# Patient Record
(demographics unavailable — no encounter records)

---

## 2024-10-28 NOTE — DEVELOPMENTAL MILESTONES
[Normal Development] : Normal Development [None] : none [Imitates scribbling] : imitates scribbling [Drinks from cup with little] : drinks from cup with little spilling [Points to ask for something] : points to ask for something or to get help [Uses 3 words other than names] : uses 3 words other than names [Speaks in sounds that seem like] : speaks in sounds that seem like an unknown language [Follows directions that do not] : follows direction that do not include a gesture [Looks when parent says,] : looks when parent says, "Where is...?" [Squats to  objects] : squats to  objects [Crawls up a few steps] : crawls up a few steps [Begins to run] : begins to run [Makes eliz with crayon] : makes eliz with tereyon [Drops object into and takes object] : drops object into and takes object out of container

## 2024-10-28 NOTE — PHYSICAL EXAM
[Alert] : alert [No Acute Distress] : no acute distress [Normocephalic] : normocephalic [Anterior [place] Closed] : anterior fontanelle closed [Red Reflex Bilateral] : red reflex bilateral [PERRL] : PERRL [Normally Placed Ears] : normally placed ears [Auricles Well Formed] : auricles well formed [Clear Tympanic membranes with present light reflex and bony landmarks] : clear tympanic membranes with present light reflex and bony landmarks [No Discharge] : no discharge [Nares Patent] : nares patent [Palate Intact] : palate intact [Uvula Midline] : uvula midline [Tooth Eruption] : tooth eruption  [Supple, full passive range of motion] : supple, full passive range of motion [No Palpable Masses] : no palpable masses [Symmetric Chest Rise] : symmetric chest rise [Clear to Auscultation Bilaterally] : clear to auscultation bilaterally [Regular Rate and Rhythm] : regular rate and rhythm [S1, S2 present] : S1, S2 present [No Murmurs] : no murmurs [+2 Femoral Pulses] : +2 femoral pulses [Soft] : soft [NonTender] : non tender [Non Distended] : non distended [Normoactive Bowel Sounds] : normoactive bowel sounds [No Hepatomegaly] : no hepatomegaly [No Splenomegaly] : no splenomegaly [Central Urethral Opening] : central urethral opening [Testicles Descended Bilaterally] : testicles descended bilaterally [Patent] : patent [Normally Placed] : normally placed [No Abnormal Lymph Nodes Palpated] : no abnormal lymph nodes palpated [No Clavicular Crepitus] : no clavicular crepitus [Negative Schaeffer-Ortalani] : negative Schaeffer-Ortalani [Symmetric Buttocks Creases] : symmetric buttocks creases [No Spinal Dimple] : no spinal dimple [NoTuft of Hair] : no tuft of hair [Cranial Nerves Grossly Intact] : cranial nerves grossly intact [No Rash or Lesions] : no rash or lesions

## 2024-10-28 NOTE — HISTORY OF PRESENT ILLNESS
[Mother] : mother [Cow's milk (Ounces per day ___)] : consumes [unfilled] oz of cow's milk per day [Normal] : Normal [Brushing teeth] : Brushing teeth [Vitamin] : Primary Fluoride Source: Vitamin [No] : Not at  exposure [Car seat in back seat] : Car seat in back seat [FreeTextEntry7] : pt has been well

## 2024-11-03 NOTE — PHYSICAL EXAM
[NL] : regular rate and rhythm, normal S1, S2 audible, no murmurs [FreeTextEntry7] : + r/r/w R>L. No distress

## 2024-11-03 NOTE — PLAN
[TextEntry] : Disc sympt tx. Disc s/s of resp distress. Rec re-eval if sx's worsen. Disc mycoplasma but PE more c/w viral bronchiolitis

## 2024-11-03 NOTE — HISTORY OF PRESENT ILLNESS
[de-identified] : cough [FreeTextEntry6] :  Pt with few d h/o c/c. Since yesterday feels warm and has ? wheeze  No fever med given today

## 2024-11-03 NOTE — HISTORY OF PRESENT ILLNESS
[de-identified] : cough [FreeTextEntry6] :  Pt with few d h/o c/c. Since yesterday feels warm and has ? wheeze  No fever med given today

## 2024-12-12 NOTE — PHYSICAL EXAM
[NL] : warm, clear [FreeTextEntry7] : Good air entry bilaterally, crackles at bilateral bases, patient with subcostal retractions and tachypnea O2 sats 93-94 after albuterol treatment.

## 2024-12-12 NOTE — DISCUSSION/SUMMARY
[FreeTextEntry1] : Patient received 1 nebulizer treatment in the office.  Breathing with no improvement.  Continued subcostal retractions and tachypnea.  O2 sat 93%.  Instructed dad to observe for the next 2 to 3 hours.  If no improvement or any worsening patient is to be brought to the emergency room for further evaluation and treatment.  Dad understands the plan

## 2024-12-12 NOTE — HISTORY OF PRESENT ILLNESS
[de-identified] : Coughing for 3 days [FreeTextEntry6] : Patient is a 16-month-old male brought to office by dad for coughing for the past 3 days.  Dad states cough has gotten worse dad feels like he hears wheezing..  Patient has had no fever no vomiting no diarrhea.  Eating and drinking well.  Patient's sister has cold and congestion.  Dad states patient has been " grunting" this morning and breathing quickly.

## 2024-12-16 NOTE — DISCUSSION/SUMMARY
[FreeTextEntry1] : Discussed RSV and mycoplasma at length with mother.  Patient to start Zithromax today.  Increase fluids, monitor temperature. Call immediately if any worsening signs or symptoms. Parent understands the plan.

## 2024-12-16 NOTE — HISTORY OF PRESENT ILLNESS
[de-identified] : RSV and mycoplasma [FreeTextEntry6] : Patient is a 16-month-old male brought to office by mom for follow-up of RSV and mycoplasma.  According to mom has been receiving nebulizer treatments every 4 hours.  No treatment this morning.  Patient has had no fever no vomiting no diarrhea eating and drinking well.  Patient was diagnosed with RSV and mycoplasma pneumonia last week.  Patient has not been on Zithromax.

## 2025-01-27 NOTE — HISTORY OF PRESENT ILLNESS
[Parents] : parents [Normal] : Normal [Brushing teeth] : Brushing teeth [Vitamin] : Primary Fluoride Source: Vitamin [No] : Not at  exposure [Car seat in back seat] : Car seat in back seat [FreeTextEntry7] : pt has been well [de-identified] : reg diet

## 2025-01-27 NOTE — DEVELOPMENTAL MILESTONES
[Normal Development] : Normal Development [None] : none [Engages with others for play] : engages with others for play [Help dress and undress self] : help dress and undress self [Points to pictures in book] : points to pictures in book [Uses 6 to 10 words other than] : does not use 6 to 10 words other than names [Sits in small chair] : sits in small chair [Throws small ball a few feet] : throws a small ball a few feet while standing [Yes] : Completed.

## 2025-01-27 NOTE — PHYSICAL EXAM
[Alert] : alert [No Acute Distress] : no acute distress [Normocephalic] : normocephalic [Anterior [place] Closed] : anterior fontanelle closed [Red Reflex Bilateral] : red reflex bilateral [PERRL] : PERRL [Normally Placed Ears] : normally placed ears [Auricles Well Formed] : auricles well formed [Clear Tympanic membranes with present light reflex and bony landmarks] : clear tympanic membranes with present light reflex and bony landmarks [No Discharge] : no discharge [Nares Patent] : nares patent [Palate Intact] : palate intact [Uvula Midline] : uvula midline [Tooth Eruption] : tooth eruption  [Supple, full passive range of motion] : supple, full passive range of motion [No Palpable Masses] : no palpable masses [Symmetric Chest Rise] : symmetric chest rise [Clear to Auscultation Bilaterally] : clear to auscultation bilaterally [Regular Rate and Rhythm] : regular rate and rhythm [S1, S2 present] : S1, S2 present [No Murmurs] : no murmurs [+2 Femoral Pulses] : +2 femoral pulses [Soft] : soft [NonTender] : non tender [Non Distended] : non distended [Normoactive Bowel Sounds] : normoactive bowel sounds [No Hepatomegaly] : no hepatomegaly [No Splenomegaly] : no splenomegaly [Central Urethral Opening] : central urethral opening [Testicles Descended Bilaterally] : testicles descended bilaterally [Patent] : patent [Normally Placed] : normally placed [No Abnormal Lymph Nodes Palpated] : no abnormal lymph nodes palpated [No Clavicular Crepitus] : no clavicular crepitus [Symmetric Buttocks Creases] : symmetric buttocks creases [No Spinal Dimple] : no spinal dimple [NoTuft of Hair] : no tuft of hair [Cranial Nerves Grossly Intact] : cranial nerves grossly intact [No Rash or Lesions] : no rash or lesions

## 2025-01-28 NOTE — PHYSICAL EXAM
[Warm, Well Perfused x4] : warm, well perfused x4 [NL] : normotonic [de-identified] : Mild swelling of lateral aspect of LT upper thigh w/erythema, blanchable. No tenderness to palpation, no significant warmth or underlying fluctuance.

## 2025-01-28 NOTE — DISCUSSION/SUMMARY
[FreeTextEntry1] : Vaccine related (Pentacel specifically) swelling of LT lower extremity. No signs of cellulitis, possible localized inflammatory response vs. hematoma. Reviewed w/parents concerning symptoms of infection and when to seek care.   Cool compresses Motrin/Tylenol if w/increased fussiness Seek care if persistent or concerns for infection such as worsening erythema, swelling, development of warmth and tenderness, restricted movement of extremity

## 2025-01-28 NOTE — HISTORY OF PRESENT ILLNESS
[FreeTextEntry6] : 18 M here for concerns of swelling of LT thigh after vaccinations, received Pentacel and Hep A yesterday. This morning noted swelling of the LT upper thigh and increased fussiness. Afebrile per mother. Ambulating and not touching area too significantly. Moving affected extremity. No other rash noted. Reports tolerating PO, no emesis, normal UOP, normal bowel movements.

## 2025-02-25 NOTE — HISTORY OF PRESENT ILLNESS
[de-identified] : swelling right eyelid [FreeTextEntry6] :  Pt with 1d h/o right upper eyelid swelling. Has had sl eye d/c in morning x 1 week. Not ill. NO fever

## 2025-02-25 NOTE — PHYSICAL EXAM
[Conjuctival Injection] : no conjunctival injection [NL] : regular rate and rhythm, normal S1, S2 audible, no murmurs [FreeTextEntry5] : right upper lid with faintly erythematous swelling. No area of distinct papule

## 2025-02-25 NOTE — HISTORY OF PRESENT ILLNESS
[de-identified] : swelling right eyelid [FreeTextEntry6] :  Pt with 1d h/o right upper eyelid swelling. Has had sl eye d/c in morning x 1 week. Not ill. NO fever

## 2025-03-03 NOTE — ADDENDUM
[FreeTextEntry1] : Spoke with parent at approx 5 PM who states pt responded well to two albuterol treatments following our visit. Reinforced supportive care and signs of distress, urging to seek care if pt demonstrating such symptoms such as worsening retractions, tachypnea. Mother verbalized understanding and pt to return for respiratory re-check tomorrow.   I have spent 30 minutes of time on the encounter which excludes teaching and/or separately reported services

## 2025-03-03 NOTE — DISCUSSION/SUMMARY
[FreeTextEntry1] : Reactive airway exacerbation due to likely viral illness.  Pt with improved exam following albuterol administration with improving tachypnea and improved air entry. Pt 96% on RA prior to neb. D/w parent RAD exacerbation, continue albuterol Q4H. Counseled extensively when pt should be seen urgently/emergently to ED including worsening tachypnea and/or retractions, inability to complete sentences or difficulty with speech, inability to tolerate PO, worsening retractions/distress. Parent verbalized understanding.   Continue albuterol Q4H; per parent no refills needed Supportive care: increase hydration, ibuprofen/acetaminophen for pain/fever, cool mist humidifier, elevate head of bed Return tomorrow for respiratory check; seek care sooner if concerns arise

## 2025-03-03 NOTE — PHYSICAL EXAM
[Cerumen in canal] : cerumen in canal [Bilateral] : (bilateral) [NL] : warm, clear [Clear] : right tympanic membrane clear [Mucoid Discharge] : mucoid discharge [Tooth Eruption] : tooth eruption  [FreeTextEntry3] : LT TM largely obstructed by cerumen, area visible clear [FreeTextEntry7] : RR 60, + belly breathing, no subcostal retractions, expiratory wheeze diffusely. S/p albuterol belly breathing noted, no retractions, RR improved 40, pt playful, no wheeze appreciated, no rhonchi or rales

## 2025-03-03 NOTE — HISTORY OF PRESENT ILLNESS
[FreeTextEntry6] : 19 M BIB father for concerns of cough and wheeze. Pt since yesterday with cough and at midnight concern for increased labored breathing. Parent feels he is appreciating a wheeze. Hx of wheeze requiring albuterol, pt has not received any medication. + Congestion and rhinorrhea. Afebrile. Reports tolerating PO, no emesis, normal UOP, normal bowel movements. Sister with fever and flu exposure. Pt with RSV in December.

## 2025-03-03 NOTE — REVIEW OF SYSTEMS
[Nasal Discharge] : nasal discharge [Nasal Congestion] : nasal congestion [Tachypnea] : tachypneic [Wheezing] : wheezing [Negative] : Genitourinary

## 2025-03-05 NOTE — HISTORY OF PRESENT ILLNESS
[de-identified] : f/u re: cough [FreeTextEntry6] :  Pt seen yesterday with cough/wheeze. Has been using albuterol neb q 4 hrs. Sx's are status quo. No fever   Flu test 3/3 nl + h/o RSV in Dec. No other wheeze since until this illness. Dad with h/o RAD

## 2025-03-05 NOTE — HISTORY OF PRESENT ILLNESS
[de-identified] : f/u re: cough [FreeTextEntry6] :  Pt seen yesterday with cough/wheeze. Has been using albuterol neb q 4 hrs. Sx's are status quo. No fever   Flu test 3/3 nl + h/o RSV in Dec. No other wheeze since until this illness. Dad with h/o RAD

## 2025-06-23 NOTE — DISCUSSION/SUMMARY
[FreeTextEntry1] : poss coxsackie virus  if no   further rash may return to school in  am otherwise  if inc amt noted most likely coxsackie virus

## 2025-06-23 NOTE — PHYSICAL EXAM
[NL] : moves all extremities x4, warm, well perfused x4 [de-identified] : R foot one pinpoint papule  noted on on R heel  none on hands or rest o[place]unk  one noted on R upper arm